# Patient Record
Sex: FEMALE | Race: WHITE | NOT HISPANIC OR LATINO | Employment: PART TIME | URBAN - NONMETROPOLITAN AREA
[De-identification: names, ages, dates, MRNs, and addresses within clinical notes are randomized per-mention and may not be internally consistent; named-entity substitution may affect disease eponyms.]

---
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
## 2019-12-13 ENCOUNTER — CLINICAL SUPPORT NO REQUIREMENTS (OUTPATIENT)
Dept: CARDIOLOGY | Facility: CLINIC | Age: 19
End: 2019-12-13

## 2019-12-13 ENCOUNTER — OFFICE VISIT (OUTPATIENT)
Dept: CARDIOLOGY | Facility: CLINIC | Age: 19
End: 2019-12-13

## 2019-12-13 VITALS — HEIGHT: 63 IN | OXYGEN SATURATION: 99 % | BODY MASS INDEX: 22.68 KG/M2 | WEIGHT: 128 LBS

## 2019-12-13 DIAGNOSIS — R07.9 CHEST PAIN, UNSPECIFIED TYPE: ICD-10-CM

## 2019-12-13 DIAGNOSIS — R00.2 PALPITATIONS: ICD-10-CM

## 2019-12-13 DIAGNOSIS — R00.2 PALPITATIONS: Primary | ICD-10-CM

## 2019-12-13 PROCEDURE — 93270 REMOTE 30 DAY ECG REV/REPORT: CPT | Performed by: INTERNAL MEDICINE

## 2019-12-13 PROCEDURE — 99203 OFFICE O/P NEW LOW 30 MIN: CPT | Performed by: INTERNAL MEDICINE

## 2019-12-13 RX ORDER — ESCITALOPRAM OXALATE 10 MG/1
10 TABLET ORAL DAILY
Refills: 2 | COMMUNITY
Start: 2019-11-20

## 2020-01-11 PROCEDURE — 93272 ECG/REVIEW INTERPRET ONLY: CPT | Performed by: INTERNAL MEDICINE

## 2020-05-01 ENCOUNTER — TELEMEDICINE (OUTPATIENT)
Dept: CARDIOLOGY | Facility: CLINIC | Age: 20
End: 2020-05-01

## 2020-05-01 VITALS — BODY MASS INDEX: 22.68 KG/M2 | WEIGHT: 128 LBS | HEIGHT: 63 IN

## 2020-05-01 DIAGNOSIS — R07.9 CHEST PAIN, UNSPECIFIED TYPE: ICD-10-CM

## 2020-05-01 DIAGNOSIS — R00.2 PALPITATIONS: Primary | ICD-10-CM

## 2020-05-01 PROCEDURE — 99213 OFFICE O/P EST LOW 20 MIN: CPT | Performed by: INTERNAL MEDICINE

## 2020-05-01 RX ORDER — ATENOLOL 25 MG/1
25 TABLET ORAL DAILY
COMMUNITY
End: 2020-05-14 | Stop reason: SDUPTHER

## 2020-05-01 NOTE — PROGRESS NOTES
[facility] CARDIOLOGY  [address] [facility] [address]  [place] [address]  Phone: [phone]  Fax: [phone]    05/01/2020    Chief Complaint   Patient presents with   • Follow-up     Event monitor    • Chest Pain        History: Patient is unable to check her blood pressure.  You have chosen to receive care through a telehealth visit.  Do you consent to use a video/audio connection for your medical care today? Yes      Tiffanie West is a 19 y.o. female seen in follow up, event monitor.  Her event monitor showed that she was having sinus arrhythmia with heart rate in the 90's.   Her palpitations has gotten worse. She is having more chest pain with associated symptom of palpitation. Chest discomfort happens thru out the day. Occasion pain described as sharp with pounding palpitations. Doesn't not happen at night. Laying down makes it better. Exertion makes the pain worse.   She has no significant past medical history.   The chart and medications were reviewed today. Problems above addressed this visit.       History reviewed. No pertinent past medical history.    History reviewed. No pertinent surgical history.     Past Social History:  Social History     Socioeconomic History   • Marital status: Single     Spouse name: Not on file   • Number of children: Not on file   • Years of education: Not on file   • Highest education level: Not on file   Tobacco Use   • Smoking status: Current Every Day Smoker     Types: Electronic Cigarette   • Smokeless tobacco: Never Used   Substance and Sexual Activity   • Alcohol use: Never     Frequency: Never   • Drug use: Never   • Sexual activity: Defer       Past Family History:  History reviewed. No pertinent family history.    Review of Systems:   Please see HPI  Constitution: No chills, no rigors, no unexplained weight loss or weight gain  Eyes:  No diplopia, no blurred vision, no loss of vision, conjunctiva is pink and sclera is anicteric  ENT:  No  tinnitus, no otorrhea, no epistaxis, no sore throat   Respiratory: No cough, no hemoptysis  Cardiovascular: see HPI  Gastrointestinal: No nausea, no vomiting, no hematemesis, no diarrhea or constipation, no melena  Genitourinary: No frequency of dysuria no hematuria  Integument: No pruritis and  no skin rash  Hematologic / Lymphatic: No excessive bleeding, easy bruising, fatigue, lymphadenopathy and petechiae  Musculoskeletal: No joint pain, joint stiffness, joint swelling, muscle pain, muscle weakness and neck pain  Neurological: No dizziness, headaches, light headedness, seizures and vertigo  Endocrine: No frequent urination and nocturia, temperature intolerance, weight gain, unintended and weight loss, unintended      Current Outpatient Medications   Medication Sig Dispense Refill   • atenolol (TENORMIN) 25 MG tablet Take 25 mg by mouth Daily.     • escitalopram (LEXAPRO) 10 MG tablet Take 10 mg by mouth Daily. FOR 30 DAYS  2   • SPRINTEC 28 0.25-35 MG-MCG per tablet TAKE 1 TABLET BY MOUTH ONCE DAILY (START SUNDAY AFTER NEXT PERIOD)  2     No current facility-administered medications for this visit.         No Known Allergies    Objective:  There were no vitals filed for this visit.    DATA:             Procedures       Assessment:  Intermittent palpitations.  Angina atypical  Chronic tobacco abuse with Ecigs      Plan:  Will proceed with a Treadmill stress test only and echocardiogram. Then start Atenolol 25 mg daily post testing.  Follow up in 2 months or sooner if needed.    Patient's Body mass index is 22.67 kg/m². BMI is within normal parameters. No follow-up required..    I extensively discussed consequences of smoking namely cardiovascular/metabolic, lung cancer, mouth cancer, pulmonary disorder including COPD and reduced quality of life.  At the end of the conversation, patient voices understanding of the risk involved in smoking.  Patient also understands the benefits of quitting.  I provided the patient  smoking cessation material. Patient displayed understanding and stated that he will try to quit smoking.  During this visit I spent  5-10 minutes counseling the patient regarding the smoking cessation.        ICD-10-CM ICD-9-CM   1. Palpitations R00.2 785.1   2. Chest pain, unspecified type R07.9 786.50        The visit has been rescheduled as a video visit to comply with patient safety concerns in accordance with CDC recommendations.  Total time of discussion was 15 minutes.     Thank you for allowing me to participate in the care of Tiffanie West. Feel free to contact me directly with any further questions or concerns.        Director, Cardiac Cath Lab    ERIK Monroy, acting as scribe for Wayne Saba MD, [facility], [facility].   05/01/20  09:14     I have read and agree the documentation that has been completed regarding this visit.  By signing this record, I attest that the documentation was completed by my physical presence and is an accurate record of the encounter.  Voice recognition / transcription technology used for some documentation in this chart in attempt to mitigate substantial inefficiencies created by this electronic health record technology.  As a result, there may be some typos and.or nonsensical language introduced into the chart that either overlooked in editing/review and/or that I am unable to correct as patient care needs require me to prioritize my attention to bedside patient care rather than electronic documentation. MA

## 2020-05-06 ENCOUNTER — HOSPITAL ENCOUNTER (OUTPATIENT)
Dept: CARDIOLOGY | Facility: HOSPITAL | Age: 20
Discharge: HOME | End: 2020-05-06
Admitting: INTERNAL MEDICINE

## 2020-05-06 ENCOUNTER — HOSPITAL ENCOUNTER (OUTPATIENT)
Dept: CARDIOLOGY | Facility: HOSPITAL | Age: 20
Discharge: HOME | End: 2020-05-06

## 2020-05-06 DIAGNOSIS — R07.9 CHEST PAIN, UNSPECIFIED TYPE: ICD-10-CM

## 2020-05-06 DIAGNOSIS — R00.2 PALPITATIONS: ICD-10-CM

## 2020-05-06 LAB
BH CV ECHO MEAS - % IVS THICK: 34.8 %
BH CV ECHO MEAS - % LVPW THICK: 61.9 %
BH CV ECHO MEAS - ACS: 2.2 CM
BH CV ECHO MEAS - AO ROOT AREA (BSA CORRECTED): 1.7
BH CV ECHO MEAS - AO ROOT AREA: 5.7 CM^2
BH CV ECHO MEAS - AO ROOT DIAM: 2.7 CM
BH CV ECHO MEAS - BSA(HAYCOCK): 1.6 M^2
BH CV ECHO MEAS - BSA: 1.6 M^2
BH CV ECHO MEAS - BZI_BMI: 22.7 KILOGRAMS/M^2
BH CV ECHO MEAS - BZI_METRIC_HEIGHT: 160 CM
BH CV ECHO MEAS - BZI_METRIC_WEIGHT: 58.1 KG
BH CV ECHO MEAS - EDV(CUBED): 72 ML
BH CV ECHO MEAS - EDV(MOD-SP4): 43.7 ML
BH CV ECHO MEAS - EDV(TEICH): 76.8 ML
BH CV ECHO MEAS - EF(CUBED): 68.8 %
BH CV ECHO MEAS - EF(MOD-SP4): 68.6 %
BH CV ECHO MEAS - EF(TEICH): 60.9 %
BH CV ECHO MEAS - ESV(CUBED): 22.4 ML
BH CV ECHO MEAS - ESV(MOD-SP4): 13.7 ML
BH CV ECHO MEAS - ESV(TEICH): 30.1 ML
BH CV ECHO MEAS - FS: 32.2 %
BH CV ECHO MEAS - IVS/LVPW: 1
BH CV ECHO MEAS - IVSD: 0.85 CM
BH CV ECHO MEAS - IVSS: 1.1 CM
BH CV ECHO MEAS - LA DIMENSION: 3.2 CM
BH CV ECHO MEAS - LA/AO: 1.2
BH CV ECHO MEAS - LV DIASTOLIC VOL/BSA (35-75): 27.3 ML/M^2
BH CV ECHO MEAS - LV MASS(C)D: 106.4 GRAMS
BH CV ECHO MEAS - LV MASS(C)DI: 66.5 GRAMS/M^2
BH CV ECHO MEAS - LV MASS(C)S: 106.5 GRAMS
BH CV ECHO MEAS - LV MASS(C)SI: 66.6 GRAMS/M^2
BH CV ECHO MEAS - LV SYSTOLIC VOL/BSA (12-30): 8.6 ML/M^2
BH CV ECHO MEAS - LVIDD: 4.2 CM
BH CV ECHO MEAS - LVIDS: 2.8 CM
BH CV ECHO MEAS - LVLD AP4: 7.1 CM
BH CV ECHO MEAS - LVLS AP4: 6.4 CM
BH CV ECHO MEAS - LVOT AREA (M): 2.5 CM^2
BH CV ECHO MEAS - LVOT AREA: 2.5 CM^2
BH CV ECHO MEAS - LVOT DIAM: 1.8 CM
BH CV ECHO MEAS - LVPWD: 0.83 CM
BH CV ECHO MEAS - LVPWS: 1.4 CM
BH CV ECHO MEAS - MV A MAX VEL: 61.3 CM/SEC
BH CV ECHO MEAS - MV E MAX VEL: 99.4 CM/SEC
BH CV ECHO MEAS - MV E/A: 1.6
BH CV ECHO MEAS - PA ACC TIME: 0.15 SEC
BH CV ECHO MEAS - PA PR(ACCEL): 11.1 MMHG
BH CV ECHO MEAS - RVDD: 1.8 CM
BH CV ECHO MEAS - SI(CUBED): 31 ML/M^2
BH CV ECHO MEAS - SI(MOD-SP4): 18.8 ML/M^2
BH CV ECHO MEAS - SI(TEICH): 29.2 ML/M^2
BH CV ECHO MEAS - SV(CUBED): 49.6 ML
BH CV ECHO MEAS - SV(MOD-SP4): 30 ML
BH CV ECHO MEAS - SV(TEICH): 46.7 ML
MAXIMAL PREDICTED HEART RATE: 199 BPM
STRESS TARGET HR: 169 BPM

## 2020-05-06 PROCEDURE — 93306 TTE W/DOPPLER COMPLETE: CPT

## 2020-05-06 PROCEDURE — 93018 CV STRESS TEST I&R ONLY: CPT | Performed by: INTERNAL MEDICINE

## 2020-05-06 PROCEDURE — 93017 CV STRESS TEST TRACING ONLY: CPT

## 2020-05-06 PROCEDURE — 93306 TTE W/DOPPLER COMPLETE: CPT | Performed by: INTERNAL MEDICINE

## 2020-05-07 ENCOUNTER — TELEPHONE (OUTPATIENT)
Dept: CARDIOLOGY | Facility: CLINIC | Age: 20
End: 2020-05-07

## 2020-05-07 LAB
BH CV STRESS BP STAGE 1: NORMAL
BH CV STRESS BP STAGE 2: NORMAL
BH CV STRESS BP STAGE 3: NORMAL
BH CV STRESS DURATION MIN STAGE 1: 3
BH CV STRESS DURATION MIN STAGE 2: 3
BH CV STRESS DURATION MIN STAGE 3: 3
BH CV STRESS DURATION SEC STAGE 1: 0
BH CV STRESS DURATION SEC STAGE 2: 0
BH CV STRESS DURATION SEC STAGE 3: 0
BH CV STRESS GRADE STAGE 1: 10
BH CV STRESS GRADE STAGE 2: 12
BH CV STRESS GRADE STAGE 3: 14
BH CV STRESS HR STAGE 1: 139
BH CV STRESS HR STAGE 2: 166
BH CV STRESS HR STAGE 3: 183
BH CV STRESS METS STAGE 1: 5
BH CV STRESS METS STAGE 2: 7.5
BH CV STRESS METS STAGE 3: 10
BH CV STRESS PROTOCOL 1: NORMAL
BH CV STRESS RECOVERY BP: NORMAL MMHG
BH CV STRESS RECOVERY HR: 101 BPM
BH CV STRESS SPEED STAGE 1: 1.7
BH CV STRESS SPEED STAGE 2: 2.5
BH CV STRESS SPEED STAGE 3: 3.4
BH CV STRESS STAGE 1: 1
BH CV STRESS STAGE 2: 2
BH CV STRESS STAGE 3: 3
MAXIMAL PREDICTED HEART RATE: 199 BPM
PERCENT MAX PREDICTED HR: 91.96 %
STRESS BASELINE BP: NORMAL MMHG
STRESS BASELINE HR: 93 BPM
STRESS PERCENT HR: 108 %
STRESS POST ESTIMATED WORKLOAD: 10.1 METS
STRESS POST EXERCISE DUR MIN: 9 MIN
STRESS POST EXERCISE DUR SEC: 0 SEC
STRESS POST PEAK BP: NORMAL MMHG
STRESS POST PEAK HR: 183 BPM
STRESS TARGET HR: 169 BPM

## 2020-05-07 NOTE — TELEPHONE ENCOUNTER
Unable to reach patient. No voicemail set up. 9:01 am 5-7-2020 /ra    Patient notified 9:18 am 5-7-2020/ra

## 2020-05-14 RX ORDER — ATENOLOL 25 MG/1
25 TABLET ORAL DAILY
Qty: 30 TABLET | Refills: 5 | Status: SHIPPED | OUTPATIENT
Start: 2020-05-14

## 2020-08-07 ENCOUNTER — OFFICE VISIT (OUTPATIENT)
Dept: CARDIOLOGY | Facility: CLINIC | Age: 20
End: 2020-08-07

## 2020-08-07 VITALS
WEIGHT: 131 LBS | BODY MASS INDEX: 23.21 KG/M2 | DIASTOLIC BLOOD PRESSURE: 66 MMHG | HEIGHT: 63 IN | OXYGEN SATURATION: 95 % | HEART RATE: 83 BPM | SYSTOLIC BLOOD PRESSURE: 104 MMHG

## 2020-08-07 DIAGNOSIS — F17.200 SMOKER: ICD-10-CM

## 2020-08-07 DIAGNOSIS — R07.9 CHEST PAIN, UNSPECIFIED TYPE: Primary | ICD-10-CM

## 2020-08-07 DIAGNOSIS — R00.2 PALPITATIONS: ICD-10-CM

## 2020-08-07 PROCEDURE — 99214 OFFICE O/P EST MOD 30 MIN: CPT | Performed by: INTERNAL MEDICINE

## 2020-08-07 NOTE — PROGRESS NOTES
[facility] CARDIOLOGY  [address] [place] [address]  Phone: [phone]  Fax: [phone]    08/06/2020    Chief Complaint   Patient presents with   • Follow-up     2 Month    • Palpitations        History:     Tiffanie West is a 20 y.o. female seen in follow up, event monitor.  Her event monitor showed that she was having sinus arrhythmia with heart rate in the 90's.   Her palpitations has gotten worse. She is having more chest pain with associated symptom of palpitation. Chest discomfort happens thru out the day. Occasion pain described as sharp and heavy.Substernal. No radiation. Left arm can go numb. Happens about 3 times a week. Takes about 3-5 minutes to resolve with pounding palpitations. Pain is worse with exertion. Hurts with taking a deep breath. No pain with coughing or sneezing.  She has no significant past medical history.   The chart and medications were reviewed today. Problems above addressed this visit.       History reviewed. No pertinent past medical history.    History reviewed. No pertinent surgical history.     Past Social History:  Social History     Socioeconomic History   • Marital status: Single     Spouse name: Not on file   • Number of children: Not on file   • Years of education: Not on file   • Highest education level: Not on file   Tobacco Use   • Smoking status: Current Every Day Smoker     Types: Electronic Cigarette   • Smokeless tobacco: Never Used   Substance and Sexual Activity   • Alcohol use: Never     Frequency: Never   • Drug use: Never   • Sexual activity: Defer       Past Family History:  Family History   Problem Relation Age of Onset   • No Known Problems Mother    • No Known Problems Father    • No Known Problems Paternal Uncle    • No Known Problems Maternal Grandmother    • No Known Problems Maternal Grandfather    • No Known Problems Paternal Grandmother    • No Known Problems Paternal Grandfather        Review of Systems:    Please see HPI  Constitution: No chills, no rigors, no unexplained weight loss or weight gain  Eyes:  No diplopia, no blurred vision, no loss of vision, conjunctiva is pink and sclera is anicteric  ENT:  No tinnitus, no otorrhea, no epistaxis, no sore throat   Respiratory: No cough, no hemoptysis  Cardiovascular: see HPI  Gastrointestinal: No nausea, no vomiting, no hematemesis, no diarrhea or constipation, no melena  Genitourinary: No frequency of dysuria no hematuria  Integument: No pruritis and  no skin rash  Hematologic / Lymphatic: No excessive bleeding, easy bruising, fatigue, lymphadenopathy and petechiae  Musculoskeletal: No joint pain, joint stiffness, joint swelling, muscle pain, muscle weakness and neck pain  Neurological: No dizziness, headaches, light headedness, seizures and vertigo  Endocrine: No frequent urination and nocturia, temperature intolerance, weight gain, unintended and weight loss, unintended      Current Outpatient Medications   Medication Sig Dispense Refill   • atenolol (TENORMIN) 25 MG tablet Take 1 tablet by mouth Daily. 30 tablet 5   • escitalopram (LEXAPRO) 10 MG tablet Take 10 mg by mouth Daily. FOR 30 DAYS  2   • SPRINTEC 28 0.25-35 MG-MCG per tablet TAKE 1 TABLET BY MOUTH ONCE DAILY (START SUNDAY AFTER NEXT PERIOD)  2     No current facility-administered medications for this visit.         No Known Allergies    Objective:  Vitals:    08/07/20 1042   BP: 104/66   Pulse: 83   SpO2: 95%       DATA:      Results for orders placed during the hospital encounter of 05/06/20   Adult Transthoracic Echo Complete W/ Cont if Necessary Per Protocol    Narrative · Left ventricular systolic function is normal. Estimated EF appears to be   in the range of 61 - 65%.  · Left ventricular diastolic function is normal.  · Normal right ventricular cavity size noted  · No significant functional valvular abnormalities noted  · There is no evidence of pericardial effusion.         Results for orders  placed during the hospital encounter of 05/06/20   Treadmill Stress Test    Narrative · Pt exercised standard nicolas protocol for 9 min, 10 METS, peak HR was 183   - 91% APMHR, no chest pain, no EKG changes for ischemia, no arrhythmias.  · Normal hemodynamic response.  · Average exercise capacity  · The Duke Treadmill Score of 9 is consistent with a Low risk for ischemic   heart disease.         Results for orders placed during the hospital encounter of 05/06/20   Treadmill Stress Test    Narrative · Pt exercised standard nicolas protocol for 9 min, 10 METS, peak HR was 183   - 91% APMHR, no chest pain, no EKG changes for ischemia, no arrhythmias.  · Normal hemodynamic response.  · Average exercise capacity  · The Duke Treadmill Score of 9 is consistent with a Low risk for ischemic   heart disease.          Procedures       Assessment:  Intermittent palpitations.  Non cardiac chest pain.   Chronic tobacco abuse with Ecigs    Plan:  Start an exercise program with her upper body.    She will stop Atenolol one month prior to appointment  Follow up in 6 months or sooner if needed.    Patient's Body mass index is 23.21 kg/m². BMI is within normal parameters. No follow-up required..    I extensively discussed consequences of smoking namely cardiovascular/metabolic, lung cancer, mouth cancer, pulmonary disorder including COPD and reduced quality of life.  At the end of the conversation, patient voices understanding of the risk involved in smoking.  Patient also understands the benefits of quitting.  I provided the patient smoking cessation material. Patient displayed understanding and stated that he will try to quit smoking.  During this visit I spent  5-10 minutes counseling the patient regarding the smoking cessation.        ICD-10-CM ICD-9-CM   1. Chest pain, unspecified type R07.9 786.50   2. Palpitations R00.2 785.1   3. Smoker F17.200 305.1            Thank you for allowing me to participate in the care of Tiffanie  Jenna. Feel free to contact me directly with any further questions or concerns.        Director, Cardiac Cath Lab    ERIK Monroy, acting as scribe for Wayne Saba MD, [facility], [facility].   08/07/20  11:06     I have read and agree the documentation that has been completed regarding this visit.  By signing this record, I attest that the documentation was completed by my physical presence and is an accurate record of the encounter.  Voice recognition / transcription technology used for some documentation in this chart in attempt to mitigate substantial inefficiencies created by this electronic health record technology.  As a result, there may be some typos and.or nonsensical language introduced into the chart that either overlooked in editing/review and/or that I am unable to correct as patient care needs require me to prioritize my attention to bedside patient care rather than electronic documentation. MA

## 2021-03-16 ENCOUNTER — BULK ORDERING (OUTPATIENT)
Dept: CASE MANAGEMENT | Facility: OTHER | Age: 21
End: 2021-03-16

## 2021-03-16 DIAGNOSIS — Z23 IMMUNIZATION DUE: ICD-10-CM

## 2022-02-03 ENCOUNTER — TRANSCRIBE ORDERS (OUTPATIENT)
Dept: LAB | Facility: HOSPITAL | Age: 22
End: 2022-02-03

## 2022-02-03 DIAGNOSIS — E04.9 NONTOXIC GOITER: Primary | ICD-10-CM

## 2022-02-09 ENCOUNTER — APPOINTMENT (OUTPATIENT)
Dept: ULTRASOUND IMAGING | Facility: HOSPITAL | Age: 22
End: 2022-02-09

## 2022-11-22 ENCOUNTER — TRANSCRIBE ORDERS (OUTPATIENT)
Dept: LAB | Facility: HOSPITAL | Age: 22
End: 2022-11-22

## 2022-11-22 ENCOUNTER — HOSPITAL ENCOUNTER (OUTPATIENT)
Dept: GENERAL RADIOLOGY | Facility: HOSPITAL | Age: 22
Discharge: HOME | End: 2022-11-22

## 2022-11-22 ENCOUNTER — LAB (OUTPATIENT)
Dept: LAB | Facility: HOSPITAL | Age: 22
End: 2022-11-22

## 2022-11-22 DIAGNOSIS — R53.83 FATIGUE, UNSPECIFIED TYPE: Primary | ICD-10-CM

## 2022-11-22 DIAGNOSIS — R53.83 FATIGUE, UNSPECIFIED TYPE: ICD-10-CM

## 2022-11-22 PROCEDURE — 36415 COLL VENOUS BLD VENIPUNCTURE: CPT

## 2022-11-22 PROCEDURE — 71046 X-RAY EXAM CHEST 2 VIEWS: CPT | Performed by: RADIOLOGY

## 2022-11-22 PROCEDURE — 80050 GENERAL HEALTH PANEL: CPT

## 2022-11-22 PROCEDURE — 71046 X-RAY EXAM CHEST 2 VIEWS: CPT

## 2022-11-23 LAB
ALBUMIN SERPL-MCNC: 4.6 G/DL (ref 3.5–5.2)
ALBUMIN/GLOB SERPL: 1.9 G/DL
ALP SERPL-CCNC: 66 U/L (ref 39–117)
ALT SERPL W P-5'-P-CCNC: 6 U/L (ref 1–33)
ANION GAP SERPL CALCULATED.3IONS-SCNC: 9.3 MMOL/L (ref 5–15)
AST SERPL-CCNC: 15 U/L (ref 1–32)
BASOPHILS # BLD AUTO: 0.03 10*3/MM3 (ref 0–0.2)
BASOPHILS NFR BLD AUTO: 0.4 % (ref 0–1.5)
BILIRUB SERPL-MCNC: 0.3 MG/DL (ref 0–1.2)
BUN SERPL-MCNC: 7 MG/DL (ref 6–20)
BUN/CREAT SERPL: 10.4 (ref 7–25)
CALCIUM SPEC-SCNC: 9.1 MG/DL (ref 8.6–10.5)
CHLORIDE SERPL-SCNC: 105 MMOL/L (ref 98–107)
CO2 SERPL-SCNC: 25.7 MMOL/L (ref 22–29)
CREAT SERPL-MCNC: 0.67 MG/DL (ref 0.57–1)
DEPRECATED RDW RBC AUTO: 41 FL (ref 37–54)
EGFRCR SERPLBLD CKD-EPI 2021: 125.3 ML/MIN/1.73
EOSINOPHIL # BLD AUTO: 0.05 10*3/MM3 (ref 0–0.4)
EOSINOPHIL NFR BLD AUTO: 0.7 % (ref 0.3–6.2)
ERYTHROCYTE [DISTWIDTH] IN BLOOD BY AUTOMATED COUNT: 13.4 % (ref 12.3–15.4)
GLOBULIN UR ELPH-MCNC: 2.4 GM/DL
GLUCOSE SERPL-MCNC: 85 MG/DL (ref 65–99)
HCT VFR BLD AUTO: 37.3 % (ref 34–46.6)
HGB BLD-MCNC: 12.7 G/DL (ref 12–15.9)
IMM GRANULOCYTES # BLD AUTO: 0.02 10*3/MM3 (ref 0–0.05)
IMM GRANULOCYTES NFR BLD AUTO: 0.3 % (ref 0–0.5)
LYMPHOCYTES # BLD AUTO: 2.21 10*3/MM3 (ref 0.7–3.1)
LYMPHOCYTES NFR BLD AUTO: 29.3 % (ref 19.6–45.3)
MCH RBC QN AUTO: 28.4 PG (ref 26.6–33)
MCHC RBC AUTO-ENTMCNC: 34 G/DL (ref 31.5–35.7)
MCV RBC AUTO: 83.4 FL (ref 79–97)
MONOCYTES # BLD AUTO: 0.41 10*3/MM3 (ref 0.1–0.9)
MONOCYTES NFR BLD AUTO: 5.4 % (ref 5–12)
NEUTROPHILS NFR BLD AUTO: 4.81 10*3/MM3 (ref 1.7–7)
NEUTROPHILS NFR BLD AUTO: 63.9 % (ref 42.7–76)
NRBC BLD AUTO-RTO: 0 /100 WBC (ref 0–0.2)
PLATELET # BLD AUTO: 250 10*3/MM3 (ref 140–450)
PMV BLD AUTO: 13 FL (ref 6–12)
POTASSIUM SERPL-SCNC: 4.1 MMOL/L (ref 3.5–5.2)
PROT SERPL-MCNC: 7 G/DL (ref 6–8.5)
RBC # BLD AUTO: 4.47 10*6/MM3 (ref 3.77–5.28)
SODIUM SERPL-SCNC: 140 MMOL/L (ref 136–145)
TSH SERPL DL<=0.05 MIU/L-ACNC: 0.54 UIU/ML (ref 0.27–4.2)
WBC NRBC COR # BLD: 7.53 10*3/MM3 (ref 3.4–10.8)

## 2023-02-02 ENCOUNTER — NURSE TRIAGE (OUTPATIENT)
Dept: CALL CENTER | Facility: HOSPITAL | Age: 23
End: 2023-02-02
Payer: COMMERCIAL

## 2023-02-02 NOTE — TELEPHONE ENCOUNTER
"Call transferred from the HUB.  Caller states that she has been having intermittent CP for 2 days, it is getting worse.  Sharp in nature and worse with a deep breath.  Instructed patient she needed to be seen in the ER today and states that she does not have time.  Discussed that could be blood clot in lung, again recommended ER, not sure if patient will go.  Reason for Disposition  • Taking a deep breath makes pain worse    Additional Information  • Negative: SEVERE difficulty breathing (e.g., struggling for each breath, speaks in single words)  • Negative: Difficult to awaken or acting confused (e.g., disoriented, slurred speech)  • Negative: Shock suspected (e.g., cold/pale/clammy skin, too weak to stand, low BP, rapid pulse)  • Negative: Passed out (i.e., fainted, collapsed and was not responding)  • Negative: [1] Chest pain lasts > 5 minutes AND [2] age > 44  • Negative: [1] Chest pain lasts > 5 minutes AND [2] age > 30 AND [3] one or more cardiac risk factors (e.g., diabetes, high blood pressure, high cholesterol, smoker, or strong family history of heart disease)  • Negative: [1] Chest pain lasts > 5 minutes AND [2] history of heart disease (i.e., angina, heart attack, heart failure, bypass surgery, takes nitroglycerin)  • Negative: [1] Chest pain lasts > 5 minutes AND [2] described as crushing, pressure-like, or heavy  • Negative: Heart beating < 50 beats per minute OR > 140 beats per minute  • Negative: Visible sweat on face or sweat dripping down face  • Negative: Sounds like a life-threatening emergency to the triager  • Negative: Followed a chest injury  • Negative: SEVERE chest pain  • Negative: [1] Chest pain (or \"angina\") comes and goes AND [2] is happening more often (increasing in frequency) or getting worse (increasing in severity) (Exception: chest pains that last only a few seconds)  • Negative: Pain also in shoulder(s) or arm(s) or jaw (Exception: pain is clearly made worse by movement)  • " "Negative: Difficulty breathing  • Negative: Dizziness or lightheadedness  • Negative: Coughing up blood  • Negative: Cocaine use within last 3 days  • Negative: Major surgery in past month  • Negative: Hip or leg fracture (broken bone) in past month (or had cast on leg or ankle in past month)  • Negative: Illness requiring prolonged bedrest in past month (e.g., immobilization, long hospital stay)  • Negative: Long-distance travel in past month (e.g., car, bus, train, plane; with trip lasting 6 or more hours)  • Negative: History of prior \"blood clot\" in leg or lungs (i.e., deep vein thrombosis, pulmonary embolism)  • Negative: History of inherited increased risk of blood clots (e.g., Factor 5 Leiden, Anti-thrombin 3, Protein C or Protein S deficiency, Prothrombin mutation)  • Negative: Cancer treatment in past six months (or has cancer now)  • Negative: [1] Chest pain lasts > 5 minutes AND [2] occurred in past 3 days (72 hours) (Exception: feels exactly the same as previously diagnosed heartburn and has accompanying sour taste in mouth)    Answer Assessment - Initial Assessment Questions  1. LOCATION: \"Where does it hurt?\"        Mid chest   2. RADIATION: \"Does the pain go anywhere else?\" (e.g., into neck, jaw, arms, back)      no  3. ONSET: \"When did the chest pain begin?\" (Minutes, hours or days)       Off and on a few days ago  4. PATTERN \"Does the pain come and go, or has it been constant since it started?\"  \"Does it get worse with exertion?\"       Off and on, getting worse  5. DURATION: \"How long does it last\" (e.g., seconds, minutes, hours)      varies  6. SEVERITY: \"How bad is the pain?\"  (e.g., Scale 1-10; mild, moderate, or severe)     - MILD (1-3): doesn't interfere with normal activities      - MODERATE (4-7): interferes with normal activities or awakens from sleep     - SEVERE (8-10): excruciating pain, unable to do any normal activities        mod  7. CARDIAC RISK FACTORS: \"Do you have any history of " "heart problems or risk factors for heart disease?\" (e.g., angina, prior heart attack; diabetes, high blood pressure, high cholesterol, smoker, or strong family history of heart disease)      smoker  8. PULMONARY RISK FACTORS: \"Do you have any history of lung disease?\"  (e.g., blood clots in lung, asthma, emphysema, birth control pills)      no  9. CAUSE: \"What do you think is causing the chest pain?\"      Not sure  10. OTHER SYMPTOMS: \"Do you have any other symptoms?\" (e.g., dizziness, nausea, vomiting, sweating, fever, difficulty breathing, cough)        Worse with deep breath  11. PREGNANCY: \"Is there any chance you are pregnant?\" \"When was your last menstrual period?\"        no    Protocols used: CHEST PAIN-ADULT-AH    "

## 2025-01-13 ENCOUNTER — HOSPITAL ENCOUNTER (OUTPATIENT)
Facility: HOSPITAL | Age: 25
Discharge: HOME | End: 2025-01-13
Payer: COMMERCIAL

## 2025-01-13 ENCOUNTER — HOSPITAL ENCOUNTER (OUTPATIENT)
Dept: ULTRASOUND IMAGING | Facility: HOSPITAL | Age: 25
Discharge: HOME | End: 2025-01-13
Payer: COMMERCIAL

## 2025-01-13 ENCOUNTER — HOSPITAL ENCOUNTER (OUTPATIENT)
Dept: GENERAL RADIOLOGY | Facility: HOSPITAL | Age: 25
Discharge: HOME | End: 2025-01-13
Payer: COMMERCIAL

## 2025-01-13 ENCOUNTER — TRANSCRIBE ORDERS (OUTPATIENT)
Dept: LAB | Facility: HOSPITAL | Age: 25
End: 2025-01-13
Payer: COMMERCIAL

## 2025-01-13 DIAGNOSIS — R63.4 LOSS OF WEIGHT: ICD-10-CM

## 2025-01-13 DIAGNOSIS — R00.0 TACHYCARDIA, UNSPECIFIED: ICD-10-CM

## 2025-01-13 DIAGNOSIS — R00.0 TACHYCARDIA, UNSPECIFIED: Primary | ICD-10-CM

## 2025-01-13 PROCEDURE — 76700 US EXAM ABDOM COMPLETE: CPT | Performed by: RADIOLOGY

## 2025-01-13 PROCEDURE — 71046 X-RAY EXAM CHEST 2 VIEWS: CPT

## 2025-01-13 PROCEDURE — 93005 ELECTROCARDIOGRAM TRACING: CPT | Performed by: FAMILY MEDICINE

## 2025-01-13 PROCEDURE — 71046 X-RAY EXAM CHEST 2 VIEWS: CPT | Performed by: RADIOLOGY

## 2025-01-13 PROCEDURE — 76700 US EXAM ABDOM COMPLETE: CPT

## 2025-01-14 LAB
QT INTERVAL: 338 MS
QTC INTERVAL: 415 MS

## 2025-04-22 ENCOUNTER — OFFICE VISIT (OUTPATIENT)
Dept: CARDIOLOGY | Facility: CLINIC | Age: 25
End: 2025-04-22
Payer: COMMERCIAL

## 2025-04-22 VITALS
BODY MASS INDEX: 20.79 KG/M2 | WEIGHT: 124.8 LBS | OXYGEN SATURATION: 99 % | SYSTOLIC BLOOD PRESSURE: 118 MMHG | HEART RATE: 91 BPM | HEIGHT: 65 IN | RESPIRATION RATE: 18 BRPM | DIASTOLIC BLOOD PRESSURE: 82 MMHG

## 2025-04-22 DIAGNOSIS — R00.2 PALPITATIONS: ICD-10-CM

## 2025-04-22 DIAGNOSIS — R55 SYNCOPE AND COLLAPSE: Primary | ICD-10-CM

## 2025-04-22 PROBLEM — F41.9 ANXIETY: Status: ACTIVE | Noted: 2025-04-22

## 2025-04-22 PROBLEM — F32.A DEPRESSION: Status: ACTIVE | Noted: 2025-04-22

## 2025-04-22 RX ORDER — TOPIRAMATE 100 MG/1
100 TABLET, FILM COATED ORAL DAILY
COMMUNITY
Start: 2025-01-11

## 2025-04-22 RX ORDER — SUMATRIPTAN SUCCINATE 100 MG/1
100 TABLET ORAL
COMMUNITY
Start: 2025-01-11

## 2025-04-22 NOTE — PROGRESS NOTES
Yumiko Gr MD Jackie D Maxey, MD    Tiffanie West  2000 04/22/2025    Subjective     Tiffanie West is a 24 y.o. female who presents today to [facility] for [facility].    History of Present Illness:  History of Present Illness  Ms. West is a 24 years old female who presents today to [facility].  Patient was referred to us by her primary care provider for tachycardia and palpitations.    Patient reports a history of  tachycardia, with episodes often occurring when sitting up straight or rolling over in bed. These episodes are frequently accompanied by severe dizziness, which has been ongoing for approximately 2 years. The dizziness is not exclusively associated with high heart rate, as she also experiences random dizzy spells without noticeable palpitations. A sensation of heat precedes these episodes. Syncope has occurred once or twice, but she typically manages to prevent it by cooling herself down by putting cold objects or ice over her chest.  She has not checked her blood pressure during these dizzy spells.  Patient reports tachycardia has been present for about 4 years, with her Apple watch frequently alerting her to elevated heart rates, sometimes as high as 136 beats per minute, even during periods of rest.   She maintains an active lifestyle including daily gym workouts, without experiencing severe chest pain or shortness of breath. However, occasional dizziness during workouts is reported, which she manages by resting.  She had seen Dr. Saba in the past for the same.  During her consultation with Dr. Saba in 2020, she was primarily concerned about her tachycardia and shortness of breath. A week-long event monitoring period did not reveal any abnormalities.  She was prescribed atenolol at that time which she has not taken.    Chest pain has been experienced intermittently for the past 1 to 2 weeks, which is not considered frequent. Shortness of  breath occurs a few days each week but is not severe enough to cause significant concern.    She has history of anxiety and depression. Lexapro is taken from time to time but is avoided as much as possible due to undesirable effects.    Migraines occur occasionally, for which she takes Topamax and Imitrex.    SOCIAL HISTORY  Occupations: Former CNA, currently working in insurance  Exercise: Goes to the gym almost every day  Diet: Mainly drinks water, with an occasional Dr. Pepper for dinner  Alcohol: Does not consume alcohol  Tobacco: Occasionally vapes  Recreational Drugs: Does not use recreational drugs  Coffee/Tea/Caffeine-containing Drinks: Does not drink coffee    FAMILY HISTORY  - Mother: POTS syndrome  - Negative for abnormal heart rhythm or heart attacks at an early age           No Known Allergies:    Current Outpatient Medications:     SPRINTEC 28 0.25-35 MG-MCG per tablet, TAKE 1 TABLET BY MOUTH ONCE DAILY (START  AFTER NEXT PERIOD), Disp: , Rfl: 2    SUMAtriptan (IMITREX) 100 MG tablet, Take 1 tablet by mouth., Disp: , Rfl:     topiramate (TOPAMAX) 100 MG tablet, Take 1 tablet by mouth Daily., Disp: , Rfl:     escitalopram (LEXAPRO) 10 MG tablet, Take 10 mg by mouth Daily. FOR 30 DAYS (Patient not taking: Reported on 2025), Disp: , Rfl: 2    Past Medical History:   Diagnosis Date    Anxiety     Depression     Syncope     Tachycardia      Past Surgical History:   Procedure Laterality Date     SECTION       SECTION       Family History   Problem Relation Age of Onset    No Known Problems Mother     Other (Pre Diabetes) Father     Anxiety disorder Father     No Known Problems Paternal Uncle     No Known Problems Maternal Grandmother     No Known Problems Maternal Grandfather     No Known Problems Paternal Grandmother     No Known Problems Paternal Grandfather      Social History     Tobacco Use    Smoking status: Every Day     Types: Electronic Cigarette    Smokeless  "tobacco: Never   Vaping Use    Vaping status: Every Day    Substances: Nicotine (3mg), Flavoring    Devices: Refillable tank    Passive vaping exposure: Yes   Substance Use Topics    Alcohol use: Never    Drug use: Never       Objective   Blood pressure 118/82, pulse 91, resp. rate 18, height 165.1 cm (65\"), weight 56.6 kg (124 lb 12.8 oz), SpO2 99%.  Body mass index is 20.77 kg/m².    Vitals reviewed.   Constitutional:       Appearance: Not in distress.   Neck:      Vascular: JVD normal.   Pulmonary:      Effort: Pulmonary effort is normal.      Breath sounds: Normal breath sounds.   Cardiovascular:      PMI at left midclavicular line. Normal rate. Regular rhythm. Normal S1. Normal S2.       Murmurs: There is no murmur.      No gallop.  No click. No rub.   Pulses:     Intact distal pulses.   Edema:     Peripheral edema absent.   Skin:     General: Skin is warm and dry.   Neurological:      Mental Status: Alert and oriented to person, place and time.       Physical Exam         DATA:   Results for orders placed during the hospital encounter of 05/06/20    Adult Transthoracic Echo Complete W/ Cont if Necessary Per Protocol    Interpretation Summary  · Left ventricular systolic function is normal. Estimated EF appears to be in the range of 61 - 65%.  · Left ventricular diastolic function is normal.  · Normal right ventricular cavity size noted  · No significant functional valvular abnormalities noted  · There is no evidence of pericardial effusion.   Results for orders placed during the hospital encounter of 05/06/20    Treadmill Stress Test    Interpretation Summary  · Pt exercised standard nicolas protocol for 9 min, 10 METS, peak HR was 183 - 91% APMHR, no chest pain, no EKG changes for ischemia, no arrhythmias.  · Normal hemodynamic response.  · Average exercise capacity  · The Duke Treadmill Score of 9 is consistent with a Low risk for ischemic heart disease.   Results for orders placed during the hospital " "encounter of 05/06/20    Treadmill Stress Test    Interpretation Summary  · Pt exercised standard nicolas protocol for 9 min, 10 METS, peak HR was 183 - 91% APMHR, no chest pain, no EKG changes for ischemia, no arrhythmias.  · Normal hemodynamic response.  · Average exercise capacity  · The Duke Treadmill Score of 9 is consistent with a Low risk for ischemic heart disease.    Results for orders placed during the hospital encounter of 01/13/25    US Abdomen Complete    Narrative  PROCEDURE: US ABDOMEN COMPLETE-    HISTORY: WT LOSS; R63.4-Abnormal weight loss    PROCEDURE: Ultrasound images of the abdomen were obtained.    COMPARISON: None.    FINDINGS: .  The visualized pancreas is unremarkable. The liver is unremarkable. The  gallbladder Is unremarkable with no shadowing stones or wall thickening.  The common duct measures 3.7 mm. The right kidney measures 10.1 cm in  length and is normal in echogenicity without hydronephrosis. The left  kidney measures 9.4 cm in length and is normal in echogenicity without  hydronephrosis.  Spleen is unremarkable.  The aorta is normal in  caliber. The vena cava is unremarkable.    Impression  Normal abdominal ultrasound.      This report was finalized on 1/13/2025 12:32 PM by Jensen Lisa M.D..      No results found for: \"BNP\"  No results found for: \"PSA\"   No results found for: \"MG\"  No results found for: \"INR\"  No results found for: \"CKTOTAL\"  No results found for: \"CHOL\", \"CHLPL\"  No results found for: \"TRIG\"  No results found for: \"HDL\"  No results found for: \"LDL\", \"LDLDIRECT\"  No components found for: \"A1C\"      Lab Results   Component Value Date    TSH 0.535 11/22/2022             Invalid input(s): \"LABALBU\", \"PROT\"        Results review: During today's encounter, all relevant clinical data has been reviewed.    Results  Labs   - TSH: 01/2025, 0.4      ECG 12 Lead    Date/Time: 4/22/2025 3:59 PM  Performed by: Pat Hamilton MD    Authorized by: Pat Hamilton MD  " Comparison: compared with previous ECG from 1/13/2025  Rhythm: sinus rhythm  Rate: normal  Other: no other findings    Clinical impression: normal ECG          Assessment & Plan    Diagnosis Plan   1. Syncope and collapse  Holter Monitor - 72 Hour Up To 15 Days    Tilt Table      2. Palpitations  Holter Monitor - 72 Hour Up To 15 Days        Assessment & Plan  1.  Tachycardia and palpitations.  2.  Presyncope and syncope.  - Will get a 2-week event monitor to look for any underlying abnormal rhythms, pauses or high-grade AV blocks.  - Will get a tilt table study to evaluate for potential vasovagal syndrome.  - Maintain adequate hydration.  - Avoid potential triggers.  - Further recommendations based on event monitor and tilt table study results.    Follow-up  - Follow-up appointment in 4 to 5 weeks.    Recommendations  Orders Placed This Encounter   Procedures    Holter Monitor - 72 Hour Up To 15 Days    Tilt Table              New Medications:   No orders of the defined types were placed in this encounter.      Discontinued Medications:   Medications Discontinued During This Encounter   Medication Reason    atenolol (TENORMIN) 25 MG tablet Patient Reported Not Taking        Return in about 5 weeks (around 5/27/2025).      Thank you for allowing me to participate in the care of Tiffanie West. Feel free to contact me directly with any further questions or concerns.      This document has been electronically signed by Pat Hamilton MD   April 22, 2025 15:54 EDT    Patient or patient representative verbalized consent for the use of Ambient Listening during the visit with  Pat Hamilton MD for chart documentation. 4/22/2025  15:54 EDT    Dictated Utilizing Dragon Dictation: Part of this note may be an electronic transcription/translation of spoken language to printed text using the Dragon Dictation System.

## 2025-04-22 NOTE — LETTER
April 22, 2025     Yumiko Gr MD  [address] [place], [place] [id number]    Patient: Tiffanie West   YOB: 2000   Date of Visit: 4/22/2025       Dear Yumiko Gr MD,    Tiffanie West was in my office today. Below are the relevant portions of my assessment and plan of care.           If you have questions, please do not hesitate to call me. I look forward to following Tiffanie along with you.         Sincerely,        Pat Hamilton MD        CC: No Recipients Vaginal delivery

## 2025-04-24 ENCOUNTER — PATIENT ROUNDING (BHMG ONLY) (OUTPATIENT)
Dept: CARDIOLOGY | Facility: CLINIC | Age: 25
End: 2025-04-24
Payer: COMMERCIAL

## 2025-04-24 NOTE — PROGRESS NOTES
Megan, My name is Roly. I am a CMA at [facility] [place].    I want to officially welcome you to our practice and ask about your most recent visit.    What do you feel went well with your visit?    Do you have any recommendations on ways we may improve?    Overall, were you satisfied with your first visit to our practice?    I appreciate you taking the time to answer a few questions today.     We are always looking for ways to make our patients experiences even better.   Please complete the [employer] Survey after your visits. We would love to receive feedback from you.   You may also share any suggestions or concerns by replying to this message or calling our office.      Thank you for allowing us to participate in your healthcare.  Please let me know if I can be of further assistance.    Kind regards,    Roly

## 2025-06-16 ENCOUNTER — HOSPITAL ENCOUNTER (OUTPATIENT)
Dept: CARDIOLOGY | Facility: HOSPITAL | Age: 25
Discharge: HOME | End: 2025-06-16
Admitting: INTERNAL MEDICINE
Payer: COMMERCIAL

## 2025-06-16 VITALS — HEIGHT: 65 IN | BODY MASS INDEX: 20.66 KG/M2 | WEIGHT: 124 LBS

## 2025-06-16 DIAGNOSIS — R55 SYNCOPE AND COLLAPSE: ICD-10-CM

## 2025-06-16 PROCEDURE — 93660 TILT TABLE EVALUATION: CPT | Performed by: INTERNAL MEDICINE

## 2025-06-16 PROCEDURE — 93660 TILT TABLE EVALUATION: CPT

## 2025-07-22 ENCOUNTER — TELEPHONE (OUTPATIENT)
Dept: CARDIOLOGY | Facility: CLINIC | Age: 25
End: 2025-07-22
Payer: COMMERCIAL

## 2025-07-23 ENCOUNTER — TELEPHONE (OUTPATIENT)
Dept: CARDIOLOGY | Facility: CLINIC | Age: 25
End: 2025-07-23
Payer: COMMERCIAL

## 2025-07-23 NOTE — TELEPHONE ENCOUNTER
PT RETURNING CALL FOR RESULTS. ADV OF DR. JAMESON MESSAGE: Holter monitor results were benign with no evidence of arrhythmia or heart block.  Patient remained in normal rhythm throughout the monitoring period.    Tilt table study was negative.  Test was negative for vasovagal syncope or POTS.  Patient had soft blood pressure and high heart rate but was in normal rhythm throughout.  Overall, benign finding.  Patient recommended to continue adequate hydration 2.5 to 3 L a day, keep some salt crackers with her in case she is to develop dizziness.  Avoid potential triggers and recommend compression stockings and abdominal binders if work requires standing for prolonged period of time.     Thank you  Pat Jameson MD      ALSO SHCEDULED F/U FOR 07/30 WITH WILEY TO DISCUSS RESULTS IN DETAIL.